# Patient Record
Sex: FEMALE | Race: BLACK OR AFRICAN AMERICAN | NOT HISPANIC OR LATINO | Employment: OTHER | URBAN - METROPOLITAN AREA
[De-identification: names, ages, dates, MRNs, and addresses within clinical notes are randomized per-mention and may not be internally consistent; named-entity substitution may affect disease eponyms.]

---

## 2017-02-07 PROBLEM — Z12.11 SCREENING FOR COLON CANCER: Status: ACTIVE | Noted: 2017-02-07

## 2018-02-19 PROBLEM — M1A.09X0 IDIOPATHIC CHRONIC GOUT OF MULTIPLE SITES WITHOUT TOPHUS: Status: ACTIVE | Noted: 2018-02-19

## 2018-02-19 PROBLEM — E55.9 VITAMIN D DEFICIENCY: Status: ACTIVE | Noted: 2018-02-19

## 2018-02-19 PROBLEM — M15.9 OSTEOARTHRITIS OF MULTIPLE JOINTS: Status: ACTIVE | Noted: 2018-02-19

## 2018-02-19 PROBLEM — D64.9 NORMOCYTIC ANEMIA: Status: ACTIVE | Noted: 2018-02-19

## 2018-02-19 PROBLEM — Z86.2 HISTORY OF SARCOIDOSIS: Status: ACTIVE | Noted: 2018-02-19

## 2018-02-19 PROBLEM — I10 ESSENTIAL HYPERTENSION: Status: ACTIVE | Noted: 2018-02-19

## 2018-02-19 PROBLEM — B02.29 HERPES ZOSTER WITH NERVOUS SYSTEM COMPLICATION: Status: ACTIVE | Noted: 2018-02-19

## 2018-02-19 PROBLEM — E03.9 HYPOTHYROIDISM IN ADULT: Status: ACTIVE | Noted: 2018-02-19

## 2018-11-12 PROBLEM — R05.3 CHRONIC COUGH: Status: ACTIVE | Noted: 2018-11-12

## 2018-11-28 PROBLEM — R05.3 CHRONIC COUGH: Status: RESOLVED | Noted: 2018-11-12 | Resolved: 2018-11-28

## 2019-08-05 PROBLEM — H04.123 DRY EYES: Status: ACTIVE | Noted: 2019-08-05

## 2019-09-16 PROBLEM — R53.1 WEAKNESS GENERALIZED: Status: ACTIVE | Noted: 2019-09-16

## 2019-09-23 PROBLEM — M15.0 PRIMARY OSTEOARTHRITIS INVOLVING MULTIPLE JOINTS: Status: ACTIVE | Noted: 2018-02-19

## 2019-09-23 PROBLEM — R53.1 WEAKNESS GENERALIZED: Status: RESOLVED | Noted: 2019-09-16 | Resolved: 2019-09-23

## 2019-09-23 PROBLEM — Z12.11 SCREENING FOR COLON CANCER: Status: RESOLVED | Noted: 2017-02-07 | Resolved: 2019-09-23

## 2019-10-21 PROBLEM — I10 ESSENTIAL HYPERTENSION: Chronic | Status: ACTIVE | Noted: 2018-02-19

## 2019-10-21 PROBLEM — I35.0 AORTIC STENOSIS: Status: ACTIVE | Noted: 2019-10-21

## 2019-10-21 PROBLEM — Z86.2 HISTORY OF SARCOIDOSIS: Chronic | Status: ACTIVE | Noted: 2018-02-19

## 2019-10-21 PROBLEM — E55.9 VITAMIN D DEFICIENCY: Chronic | Status: ACTIVE | Noted: 2018-02-19

## 2019-10-21 PROBLEM — M1A.09X0 IDIOPATHIC CHRONIC GOUT OF MULTIPLE SITES WITHOUT TOPHUS: Chronic | Status: ACTIVE | Noted: 2018-02-19

## 2019-10-21 PROBLEM — E03.9 HYPOTHYROIDISM IN ADULT: Chronic | Status: ACTIVE | Noted: 2018-02-19

## 2020-01-26 PROBLEM — I35.0 NONRHEUMATIC AORTIC VALVE STENOSIS: Chronic | Status: ACTIVE | Noted: 2019-10-21

## 2020-01-27 PROBLEM — N18.30 CKD (CHRONIC KIDNEY DISEASE) STAGE 3, GFR 30-59 ML/MIN: Status: ACTIVE | Noted: 2020-01-27

## 2020-04-05 PROBLEM — D64.9 NORMOCYTIC ANEMIA: Chronic | Status: ACTIVE | Noted: 2018-02-19

## 2020-04-05 PROBLEM — M15.0 PRIMARY OSTEOARTHRITIS INVOLVING MULTIPLE JOINTS: Chronic | Status: ACTIVE | Noted: 2018-02-19

## 2020-04-05 PROBLEM — M15.9 PRIMARY OSTEOARTHRITIS INVOLVING MULTIPLE JOINTS: Chronic | Status: ACTIVE | Noted: 2018-02-19

## 2020-09-29 PROBLEM — N18.30 CKD (CHRONIC KIDNEY DISEASE) STAGE 3, GFR 30-59 ML/MIN: Chronic | Status: ACTIVE | Noted: 2020-01-27

## 2020-10-04 PROBLEM — N18.32 STAGE 3B CHRONIC KIDNEY DISEASE: Chronic | Status: ACTIVE | Noted: 2020-01-27

## 2020-10-04 PROBLEM — N18.32 STAGE 3B CHRONIC KIDNEY DISEASE: Status: ACTIVE | Noted: 2020-01-27

## 2021-04-19 PROBLEM — G47.10 DAYTIME HYPERSOMNIA: Status: ACTIVE | Noted: 2021-04-19

## 2021-12-13 PROBLEM — L03.116 CELLULITIS OF BOTH LOWER EXTREMITIES: Status: ACTIVE | Noted: 2021-12-13

## 2021-12-13 PROBLEM — L03.115 CELLULITIS OF BOTH LOWER EXTREMITIES: Status: ACTIVE | Noted: 2021-12-13

## 2021-12-29 PROBLEM — Q82.0 LYMPHEDEMA, HEREDITARY: Status: ACTIVE | Noted: 2021-12-29

## 2022-03-24 PROBLEM — I89.0 LYMPHEDEMA OF RIGHT LOWER EXTREMITY: Status: ACTIVE | Noted: 2021-12-29

## 2022-03-29 PROBLEM — G47.10 DAYTIME HYPERSOMNIA: Status: RESOLVED | Noted: 2021-04-19 | Resolved: 2022-03-29

## 2022-03-29 PROBLEM — B02.29 HERPES ZOSTER WITH NERVOUS SYSTEM COMPLICATION: Status: RESOLVED | Noted: 2018-02-19 | Resolved: 2022-03-29

## 2022-03-29 PROBLEM — H04.123 DRY EYES: Status: RESOLVED | Noted: 2019-08-05 | Resolved: 2022-03-29

## 2022-04-20 PROBLEM — I87.312 IDIOPATHIC CHRONIC VENOUS HYPERTENSION OF LEFT LEG WITH ULCER: Status: ACTIVE | Noted: 2022-04-20

## 2022-04-20 PROBLEM — L97.919 IDIOPATHIC CHRONIC VENOUS HYPERTENSION OF RIGHT LEG WITH ULCER: Status: ACTIVE | Noted: 2022-04-20

## 2022-04-20 PROBLEM — L97.821 NON-PRESSURE CHRONIC ULCER OF OTHER PART OF LEFT LOWER LEG LIMITED TO BREAKDOWN OF SKIN: Status: ACTIVE | Noted: 2022-04-20

## 2022-04-20 PROBLEM — L97.929 IDIOPATHIC CHRONIC VENOUS HYPERTENSION OF LEFT LEG WITH ULCER: Status: ACTIVE | Noted: 2022-04-20

## 2022-04-20 PROBLEM — I87.311 IDIOPATHIC CHRONIC VENOUS HYPERTENSION OF RIGHT LEG WITH ULCER: Status: ACTIVE | Noted: 2022-04-20

## 2022-04-20 PROBLEM — L97.812 NON-PRESSURE CHRONIC ULCER OF OTHER PART OF RIGHT LOWER LEG WITH FAT LAYER EXPOSED: Status: ACTIVE | Noted: 2022-04-20

## 2022-04-27 PROBLEM — L97.822 NON-PRESSURE CHRONIC ULCER OF OTHER PART OF LEFT LOWER LEG WITH FAT LAYER EXPOSED: Status: ACTIVE | Noted: 2022-04-27

## 2022-05-23 PROBLEM — H54.7 DECREASED VISION: Status: ACTIVE | Noted: 2022-05-23

## 2022-06-22 PROBLEM — L97.822 NON-PRESSURE CHRONIC ULCER OF OTHER PART OF LEFT LOWER LEG WITH FAT LAYER EXPOSED: Status: RESOLVED | Noted: 2022-04-27 | Resolved: 2022-06-22

## 2022-06-22 PROBLEM — L97.929 IDIOPATHIC CHRONIC VENOUS HYPERTENSION OF LEFT LEG WITH ULCER: Status: RESOLVED | Noted: 2022-04-20 | Resolved: 2022-06-22

## 2022-06-22 PROBLEM — I87.312 IDIOPATHIC CHRONIC VENOUS HYPERTENSION OF LEFT LEG WITH ULCER: Status: RESOLVED | Noted: 2022-04-20 | Resolved: 2022-06-22

## 2023-05-08 PROBLEM — M25.512 CHRONIC LEFT SHOULDER PAIN: Status: ACTIVE | Noted: 2023-05-08

## 2023-05-08 PROBLEM — G89.29 CHRONIC LEFT SHOULDER PAIN: Status: ACTIVE | Noted: 2023-05-08

## 2024-05-29 PROBLEM — L97.929 IDIOPATHIC CHRONIC VENOUS HYPERTENSION OF LEFT LOWER EXTREMITY WITH ULCER: Status: ACTIVE | Noted: 2024-05-29

## 2024-05-29 PROBLEM — I87.312 IDIOPATHIC CHRONIC VENOUS HYPERTENSION OF LEFT LOWER EXTREMITY WITH ULCER: Status: ACTIVE | Noted: 2024-05-29

## 2024-09-18 PROBLEM — R30.0 DYSURIA: Status: ACTIVE | Noted: 2024-09-18

## 2024-09-20 PROBLEM — G45.9 TIA (TRANSIENT ISCHEMIC ATTACK): Status: ACTIVE | Noted: 2024-09-20

## 2024-09-20 PROBLEM — R47.1 DYSARTHRIA: Status: ACTIVE | Noted: 2024-09-20

## 2024-11-28 PROBLEM — E87.5 HYPERKALEMIA: Status: ACTIVE | Noted: 2024-11-28

## 2024-11-28 PROBLEM — N17.9 AKI (ACUTE KIDNEY INJURY): Status: ACTIVE | Noted: 2024-11-28

## 2024-11-30 PROBLEM — D50.9 IRON DEFICIENCY ANEMIA: Status: ACTIVE | Noted: 2024-11-30

## 2024-12-03 PROBLEM — R06.09 DYSPNEA ON EXERTION: Status: ACTIVE | Noted: 2024-12-03

## 2024-12-05 PROBLEM — S81.801A WOUND OF RIGHT LOWER EXTREMITY: Status: ACTIVE | Noted: 2024-12-05

## 2024-12-10 PROBLEM — I50.32 CHRONIC DIASTOLIC HEART FAILURE, NYHA CLASS 2: Status: ACTIVE | Noted: 2024-12-10

## 2024-12-27 PROBLEM — J11.1 BRONCHITIS WITH INFLUENZA: Status: ACTIVE | Noted: 2024-12-27

## 2024-12-27 PROBLEM — J10.1 INFLUENZA A: Status: ACTIVE | Noted: 2024-12-27

## 2024-12-30 PROBLEM — A49.8 PSEUDOMONAS INFECTION: Status: ACTIVE | Noted: 2024-12-30

## 2025-02-24 PROBLEM — T14.8XXA WOUND INFECTION: Status: ACTIVE | Noted: 2025-02-24

## 2025-02-24 PROBLEM — L08.9 WOUND INFECTION: Status: ACTIVE | Noted: 2025-02-24

## 2025-02-26 ENCOUNTER — EXTERNAL HOME HEALTH (OUTPATIENT)
Dept: HOME HEALTH SERVICES | Facility: HOSPITAL | Age: 85
End: 2025-02-26
Payer: MEDICARE

## 2025-04-11 PROBLEM — N18.9 ANEMIA OF CHRONIC RENAL FAILURE: Status: ACTIVE | Noted: 2025-04-11

## 2025-04-11 PROBLEM — D63.1 ANEMIA OF CHRONIC RENAL FAILURE: Status: ACTIVE | Noted: 2025-04-11

## 2025-05-01 PROBLEM — S81.812A LACERATION OF LEFT LOWER EXTREMITY: Status: ACTIVE | Noted: 2025-05-01

## 2025-05-08 PROBLEM — I87.2 VENOUS INSUFFICIENCY: Status: ACTIVE | Noted: 2025-05-08

## 2025-05-08 PROBLEM — I16.0 HYPERTENSIVE URGENCY: Status: ACTIVE | Noted: 2025-05-08

## 2025-05-10 PROBLEM — I50.32 CHRONIC DIASTOLIC HEART FAILURE, NYHA CLASS 2: Chronic | Status: ACTIVE | Noted: 2024-12-10

## 2025-05-15 ENCOUNTER — OUTPATIENT CASE MANAGEMENT (OUTPATIENT)
Dept: ADMINISTRATIVE | Facility: OTHER | Age: 85
End: 2025-05-15
Payer: MEDICARE

## 2025-05-15 ENCOUNTER — TELEPHONE (OUTPATIENT)
Dept: ADMINISTRATIVE | Facility: CLINIC | Age: 85
End: 2025-05-15
Payer: MEDICARE

## 2025-05-15 NOTE — PROGRESS NOTES
Outpatient Care Management  Patient Not Qualified    Patient: Opal Desouza  MRN:  [id number]  Date of Service:  5/15/2025  Completed by:  Teresa Lopez RN    Chief Complaint   Patient presents with    OPCM Chart Review    OPCM Enrollment Call    Case Closure       Patient Summary           Reason Not Qualified:  Followed by SNF

## 2025-05-21 ENCOUNTER — TELEPHONE (OUTPATIENT)
Dept: ADMINISTRATIVE | Facility: CLINIC | Age: 85
End: 2025-05-21
Payer: MEDICARE

## 2025-05-28 ENCOUNTER — TELEPHONE (OUTPATIENT)
Dept: ADMINISTRATIVE | Facility: CLINIC | Age: 85
End: 2025-05-28
Payer: MEDICARE

## 2025-06-04 ENCOUNTER — TELEPHONE (OUTPATIENT)
Dept: ADMINISTRATIVE | Facility: CLINIC | Age: 85
End: 2025-06-04
Payer: MEDICARE

## 2025-06-05 ENCOUNTER — PATIENT OUTREACH (OUTPATIENT)
Facility: HOSPITAL | Age: 85
End: 2025-06-05
Payer: MEDICARE

## 2025-06-05 DIAGNOSIS — L03.116 BILATERAL LOWER LEG CELLULITIS: Primary | ICD-10-CM

## 2025-06-05 DIAGNOSIS — L03.115 BILATERAL LOWER LEG CELLULITIS: Primary | ICD-10-CM

## 2025-06-06 ENCOUNTER — OUTPATIENT CASE MANAGEMENT (OUTPATIENT)
Dept: ADMINISTRATIVE | Facility: OTHER | Age: 85
End: 2025-06-06
Payer: MEDICARE

## 2025-06-17 ENCOUNTER — OUTPATIENT CASE MANAGEMENT (OUTPATIENT)
Dept: ADMINISTRATIVE | Facility: OTHER | Age: 85
End: 2025-06-17
Payer: MEDICARE

## 2025-07-14 PROBLEM — I87.2 CHRONIC VENOUS INSUFFICIENCY OF LOWER EXTREMITY: Status: ACTIVE | Noted: 2025-07-14

## 2025-08-26 PROBLEM — I50.32 CHRONIC DIASTOLIC HF (HEART FAILURE), NYHA CLASS 3: Status: ACTIVE | Noted: 2025-08-26

## 2025-08-27 ENCOUNTER — TELEPHONE (OUTPATIENT)
Dept: CARDIAC REHAB | Facility: CLINIC | Age: 85
End: 2025-08-27
Payer: MEDICARE

## 2025-08-29 ENCOUNTER — PATIENT OUTREACH (OUTPATIENT)
Dept: ADMINISTRATIVE | Facility: CLINIC | Age: 85
End: 2025-08-29
Payer: MEDICARE